# Patient Record
Sex: FEMALE | Race: WHITE | NOT HISPANIC OR LATINO | Employment: FULL TIME | URBAN - METROPOLITAN AREA
[De-identification: names, ages, dates, MRNs, and addresses within clinical notes are randomized per-mention and may not be internally consistent; named-entity substitution may affect disease eponyms.]

---

## 2017-04-03 ENCOUNTER — OFFICE VISIT (OUTPATIENT)
Dept: OBSTETRICS AND GYNECOLOGY | Facility: CLINIC | Age: 27
End: 2017-04-03
Payer: COMMERCIAL

## 2017-04-03 VITALS
HEIGHT: 66 IN | SYSTOLIC BLOOD PRESSURE: 112 MMHG | BODY MASS INDEX: 17.22 KG/M2 | DIASTOLIC BLOOD PRESSURE: 68 MMHG | WEIGHT: 107.13 LBS

## 2017-04-03 DIAGNOSIS — N89.8 VAGINAL DISCHARGE: Primary | ICD-10-CM

## 2017-04-03 PROCEDURE — 99999 PR PBB SHADOW E&M-EST. PATIENT-LVL II: CPT | Mod: PBBFAC,,, | Performed by: OBSTETRICS & GYNECOLOGY

## 2017-04-03 PROCEDURE — 87210 SMEAR WET MOUNT SALINE/INK: CPT | Mod: QW,S$GLB,, | Performed by: OBSTETRICS & GYNECOLOGY

## 2017-04-03 PROCEDURE — 99213 OFFICE O/P EST LOW 20 MIN: CPT | Mod: 25,S$GLB,, | Performed by: OBSTETRICS & GYNECOLOGY

## 2017-04-03 PROCEDURE — 1160F RVW MEDS BY RX/DR IN RCRD: CPT | Mod: S$GLB,,, | Performed by: OBSTETRICS & GYNECOLOGY

## 2017-04-03 PROCEDURE — 87591 N.GONORRHOEAE DNA AMP PROB: CPT

## 2017-04-03 NOTE — PROGRESS NOTES
Subjective:       Patient ID: Diana Staley is a 26 y.o. female.    Chief Complaint:  Vaginal Discharge      History of Present Illness  HPI  Presents with a brown vaginal discharge.  It started on 3/6/17.  States she took her OCP about 1 hour late on 3/5/17, but otherwise no changes.  No vaginal odor, itching, or burning.  The discharge continued until 2 days before her placebo week of pills.  At that point, it became more red, then she had her period on 3/19/17.  Since her period stopped, she no longer notices the discharge.  No new sex partners.    GYN & OB History  No LMP recorded (lmp unknown). Patient is not currently having periods (Reason: Birth Control).   Date of Last Pap: 3/27/2015    OB History    Para Term  AB SAB TAB Ectopic Multiple Living   0 0 0 0 0 0 0 0 0 0             Review of Systems  Review of Systems   Constitutional: Negative for fatigue, fever and unexpected weight change.   Gastrointestinal: Negative for abdominal pain, constipation, diarrhea, nausea and vomiting.   Genitourinary: Positive for vaginal discharge. Negative for dysuria, frequency, urgency, vaginal bleeding, vaginal pain, postcoital bleeding and vaginal odor.           Objective:    Physical Exam:   Constitutional: She is oriented to person, place, and time. She appears well-developed and well-nourished. No distress.             Abdominal: Soft. She exhibits no distension and no mass. There is no tenderness. There is no rebound and no guarding. Hernia confirmed negative in the right inguinal area and confirmed negative in the left inguinal area.     Genitourinary: Vagina normal. Pelvic exam was performed with patient supine. There is no rash, tenderness, lesion or injury on the right labia. There is no rash, tenderness, lesion or injury on the left labia. Uterus is not deviated, not enlarged, not fixed, not tender and not experiencing uterine prolapse. Right adnexum displays no mass, no tenderness  and no fullness. Left adnexum displays no mass, no tenderness and no fullness. No erythema, tenderness, bleeding, rectocele, cystocele or unspecified prolapse of vaginal walls in the vagina. No foreign body in the vagina. No signs of injury around the vagina. No vaginal discharge found. Cervix exhibits no motion tenderness, no discharge and no friability.        Uterus Size: 6 cm       Neurological: She is alert and oriented to person, place, and time.     Psychiatric: She has a normal mood and affect.        Wet prep: normal vaginal squamous epithelial cells    Assessment:        1. Vaginal discharge                Plan:      Diana was seen today for vaginal discharge.    Diagnoses and all orders for this visit:    Vaginal discharge  -     C. trachomatis/N. gonorrhoeae by AMP DNA Urine    Reassured the patient that her discharge was likely some light breakthrough bleeding related to a slight delay in taking her OCP the day prior.  Her symptoms have resolved.    RTC prn.

## 2017-04-04 LAB
C TRACH DNA SPEC QL NAA+PROBE: NOT DETECTED
N GONORRHOEA DNA SPEC QL NAA+PROBE: NOT DETECTED

## 2017-11-04 DIAGNOSIS — Z30.011 ENCOUNTER FOR INITIAL PRESCRIPTION OF CONTRACEPTIVE PILLS: ICD-10-CM

## 2017-11-06 DIAGNOSIS — Z30.011 ENCOUNTER FOR INITIAL PRESCRIPTION OF CONTRACEPTIVE PILLS: ICD-10-CM

## 2017-11-06 RX ORDER — NORGESTIMATE AND ETHINYL ESTRADIOL 0.25-0.035
1 KIT ORAL DAILY
Qty: 28 TABLET | Refills: 0 | OUTPATIENT
Start: 2017-11-06 | End: 2018-11-06

## 2017-11-06 NOTE — TELEPHONE ENCOUNTER
Pt's last annual 11/29/16. Pt notified that she is due for an annual at the end of this month. Medication pending. Please advise.

## 2017-11-27 DIAGNOSIS — Z30.011 ENCOUNTER FOR INITIAL PRESCRIPTION OF CONTRACEPTIVE PILLS: ICD-10-CM

## 2017-11-27 RX ORDER — NORGESTIMATE AND ETHINYL ESTRADIOL 0.25-0.035
1 KIT ORAL DAILY
Qty: 28 TABLET | Refills: 0 | Status: SHIPPED | OUTPATIENT
Start: 2017-11-27 | End: 2017-12-05 | Stop reason: SDUPTHER

## 2017-11-27 NOTE — TELEPHONE ENCOUNTER
Pt's last annual was 11/2016. Pt scheduled for annual exam 12/5/17. Pt requesting refill on OCP. Medication pended.

## 2017-12-05 ENCOUNTER — OFFICE VISIT (OUTPATIENT)
Dept: OBSTETRICS AND GYNECOLOGY | Facility: CLINIC | Age: 27
End: 2017-12-05
Payer: COMMERCIAL

## 2017-12-05 VITALS
SYSTOLIC BLOOD PRESSURE: 112 MMHG | HEIGHT: 66 IN | BODY MASS INDEX: 17.65 KG/M2 | DIASTOLIC BLOOD PRESSURE: 70 MMHG | WEIGHT: 109.81 LBS

## 2017-12-05 DIAGNOSIS — Z30.41 ENCOUNTER FOR SURVEILLANCE OF CONTRACEPTIVE PILLS: ICD-10-CM

## 2017-12-05 DIAGNOSIS — Z01.419 PAP SMEAR, AS PART OF ROUTINE GYNECOLOGICAL EXAMINATION: ICD-10-CM

## 2017-12-05 DIAGNOSIS — Z01.419 WELL WOMAN EXAM WITH ROUTINE GYNECOLOGICAL EXAM: Primary | ICD-10-CM

## 2017-12-05 PROCEDURE — 99999 PR PBB SHADOW E&M-EST. PATIENT-LVL III: CPT | Mod: PBBFAC,,, | Performed by: OBSTETRICS & GYNECOLOGY

## 2017-12-05 PROCEDURE — 99395 PREV VISIT EST AGE 18-39: CPT | Mod: S$GLB,,, | Performed by: OBSTETRICS & GYNECOLOGY

## 2017-12-05 PROCEDURE — 88175 CYTOPATH C/V AUTO FLUID REDO: CPT

## 2017-12-05 RX ORDER — NORGESTIMATE AND ETHINYL ESTRADIOL 0.25-0.035
1 KIT ORAL DAILY
Qty: 28 TABLET | Refills: 12 | Status: SHIPPED | OUTPATIENT
Start: 2017-12-05 | End: 2018-01-10 | Stop reason: ALTCHOICE

## 2017-12-05 NOTE — PROGRESS NOTES
Subjective:       Patient ID: Diana Staley is a 27 y.o. female.    Chief Complaint:  Annual Exam      History of Present Illness  HPI  Presents for well-woman exam.  No gyn complaints.  Needs refill of Mononessa OCP's.  Doing well on them.  Is mutually monogomous with the same male partner.  No new sex partners in the past year.  Declines STD screening.  Pap due today.    GYN & OB History  Patient's last menstrual period was 2017.   Date of Last Pap: 3/27/2015    OB History    Para Term  AB Living   0 0 0 0 0 0   SAB TAB Ectopic Multiple Live Births   0 0 0 0               Review of Systems  Review of Systems   Constitutional: Negative for activity change, fatigue, fever and unexpected weight change.   Gastrointestinal: Negative for abdominal pain, bloating, constipation, diarrhea, nausea and vomiting.   Endocrine: Negative for hair loss and hot flashes.   Genitourinary: Negative for dyspareunia, dysuria, frequency, genital sores, hematuria, menorrhagia, menstrual problem, pelvic pain, urgency, vaginal bleeding, vaginal discharge, vaginal pain, dysmenorrhea, postcoital bleeding and vaginal odor.   Skin:  Negative for rash and hair changes.   Hematological: Negative for adenopathy.   Breast: Negative for breast mass, breast pain, nipple discharge and skin changes          Objective:    Physical Exam:   Constitutional: She is oriented to person, place, and time. She appears well-developed and well-nourished. No distress.      Neck: Neck supple. No thyromegaly present.     Pulmonary/Chest: Right breast exhibits no inverted nipple, no mass, no nipple discharge, no skin change, no tenderness, no bleeding and no swelling. Left breast exhibits no inverted nipple, no mass, no nipple discharge, no skin change, no tenderness, no bleeding and no swelling. Breasts are symmetrical.        Abdominal: Soft. She exhibits no distension and no mass. There is no tenderness. There is no rebound and no  guarding.     Genitourinary: Pelvic exam was performed with patient supine. There is no rash, tenderness, lesion or injury on the right labia. There is no rash, tenderness, lesion or injury on the left labia. Uterus is not deviated, not enlarged, not fixed, not tender, not hosting fibroids and not experiencing uterine prolapse. Cervix is normal. Right adnexum displays no mass, no tenderness and no fullness. Left adnexum displays no mass, no tenderness and no fullness. No erythema, tenderness, bleeding, rectocele or cystocele in the vagina. No foreign body in the vagina. No signs of injury around the vagina. No vaginal discharge found. Cervix exhibits no motion tenderness, no discharge and no friability.        Uterus Size: 6 cm      Lymphadenopathy:        Right: No inguinal adenopathy present.        Left: No inguinal adenopathy present.    Neurological: She is alert and oriented to person, place, and time.     Psychiatric: She has a normal mood and affect.          Assessment:        1. Well woman exam with routine gynecological exam    2. Pap smear, as part of routine gynecological examination    3. Encounter for surveillance of contraceptive pills                Plan:      Diana was seen today for annual exam.    Diagnoses and all orders for this visit:    Well woman exam with routine gynecological exam    Pap smear, as part of routine gynecological examination  -     Liquid-based pap smear, screening    Encounter for surveillance of contraceptive pills  -     norgestimate-ethinyl estradiol (MONONESSA, 28,) 0.25-35 mg-mcg per tablet; Take 1 tablet by mouth once daily.    Reviewed updated recommendations for pap smears (every 3 years) in low risk patients.   Recommend annual pelvic exams.  Reviewed recommendations for annual CBE.  RTC 1 year.

## 2018-01-10 ENCOUNTER — PATIENT MESSAGE (OUTPATIENT)
Dept: OBSTETRICS AND GYNECOLOGY | Facility: CLINIC | Age: 28
End: 2018-01-10

## 2018-01-10 DIAGNOSIS — Z30.011 ENCOUNTER FOR INITIAL PRESCRIPTION OF CONTRACEPTIVE PILLS: Primary | ICD-10-CM

## 2018-01-10 RX ORDER — LEVONORGESTREL AND ETHINYL ESTRADIOL 0.15-0.03
1 KIT ORAL DAILY
Qty: 28 TABLET | Refills: 11 | Status: SHIPPED | OUTPATIENT
Start: 2018-01-10 | End: 2018-11-19 | Stop reason: SDUPTHER

## 2018-04-19 ENCOUNTER — PATIENT MESSAGE (OUTPATIENT)
Dept: OBSTETRICS AND GYNECOLOGY | Facility: CLINIC | Age: 28
End: 2018-04-19

## 2018-11-19 ENCOUNTER — OFFICE VISIT (OUTPATIENT)
Dept: OBSTETRICS AND GYNECOLOGY | Facility: CLINIC | Age: 28
End: 2018-11-19
Payer: COMMERCIAL

## 2018-11-19 VITALS
WEIGHT: 112.44 LBS | HEIGHT: 66 IN | BODY MASS INDEX: 18.07 KG/M2 | SYSTOLIC BLOOD PRESSURE: 112 MMHG | DIASTOLIC BLOOD PRESSURE: 80 MMHG

## 2018-11-19 DIAGNOSIS — Z01.419 WELL WOMAN EXAM WITH ROUTINE GYNECOLOGICAL EXAM: Primary | ICD-10-CM

## 2018-11-19 DIAGNOSIS — Z30.011 ENCOUNTER FOR INITIAL PRESCRIPTION OF CONTRACEPTIVE PILLS: ICD-10-CM

## 2018-11-19 DIAGNOSIS — N92.1 BREAKTHROUGH BLEEDING ON BIRTH CONTROL PILLS: ICD-10-CM

## 2018-11-19 PROCEDURE — 87591 N.GONORRHOEAE DNA AMP PROB: CPT

## 2018-11-19 PROCEDURE — 99395 PREV VISIT EST AGE 18-39: CPT | Mod: S$GLB,,, | Performed by: OBSTETRICS & GYNECOLOGY

## 2018-11-19 PROCEDURE — 99999 PR PBB SHADOW E&M-EST. PATIENT-LVL III: CPT | Mod: PBBFAC,,, | Performed by: OBSTETRICS & GYNECOLOGY

## 2018-11-19 RX ORDER — LEVONORGESTREL AND ETHINYL ESTRADIOL 0.15-0.03
1 KIT ORAL DAILY
Qty: 28 TABLET | Refills: 11 | Status: SHIPPED | OUTPATIENT
Start: 2018-11-19 | End: 2019-11-19 | Stop reason: SDUPTHER

## 2018-11-19 NOTE — PATIENT INSTRUCTIONS
Breast Health: Breast Self-Awareness  What is breast self-awareness?  Breast self-awareness is knowing how your breasts normally look and feel. Your breasts change as you go through different stages of your life. So its important to learn what is normal for your breasts. Breast self-awareness helps you notice any changes in your breasts right away. Report any changes to your healthcare provider.  Why is breast self-awareness important?  Many experts now say that women should focus on breast self-awareness instead of doing a breast self-examination (BSE). These experts include the American Cancer Society, the U.S. Preventive Services Task Force, and the American Congress of Obstetricians and Gynecologists. Some experts even advise not teaching women to do a BSE. Thats because research hasnt shown a clear benefit to doing BSEs.  Breast self-awareness is different than a BSE. Breast self-awareness isnt about following a certain method and schedule. Its about knowing what's normal for your breasts. That way you can notice even small changes right away. If you see any changes, report them to your healthcare provider.  Changes to look for  Call your healthcare provider if you find any changes in your breasts that concern you. These changes may include:  · A lump  · Nipple discharge other than breast milk, especially a bloody discharge  · Swelling  · A change in size or shape  · Skin irritation, such as redness, thickening, or dimpling of the skin  · Swollen lymph nodes in the armpit  · Nipple problems, such as pain or redness  If you find a lump  Contact your provider if you find lumpiness in one breast, feel something different in the tissue, or feel a definite lump. Sometimes lumpiness may be due to menstrual changes. But there may be reason for concern.  Your provider may want to see you right away if you have:  · Nipple discharge that is bloody  · Skin changes on your breast, such as dimpling or puckering  Its  normal to be upset if you find a lump. But its important to contact your provider right away. Remember that most breast lumps are benign. This means they are not cancer.  Date Last Reviewed: 8/10/2015  © 4162-0992 [employer]. [address] [place] [address], [place] [address]. All rights reserved. This information is not intended as a substitute for professional medical care. Always follow your healthcare professional's instructions.

## 2018-11-19 NOTE — PROGRESS NOTES
Subjective:       Patient ID: Diana Staley is a 27 y.o. female.    Chief Complaint:  Well Woman      History of Present Illness  HPI  Presents for well-woman exam and to discuss contraception.  Pt takes nordette OCP's, and really likes them.  Has been on them a little over 1 year.  Has noticed some breakthrough bleeding during her second week of pills for the last couple of months, but none this month.  Denies any vaginal discharge or postcoital bleeding.  Is mutually monogamous with a male partner, and has no new sex partners in the last year.  Prefers to stay on her pill for now, and will see how things go the next few months.  Needs refill.  Last pap: 2017: normal    GYN & OB History  Patient's last menstrual period was 10/29/2018 (exact date).   Date of Last Pap: 2017    OB History    Para Term  AB Living   0 0 0 0 0 0   SAB TAB Ectopic Multiple Live Births   0 0 0 0               Review of Systems  Review of Systems   Constitutional: Negative for fatigue, fever and unexpected weight change.   Gastrointestinal: Negative for abdominal pain, constipation, diarrhea, nausea and vomiting.   Genitourinary: Positive for menstrual problem (see HPI). Negative for dysuria, frequency, menorrhagia, urgency, vaginal bleeding, vaginal discharge, vaginal pain, dysmenorrhea, postcoital bleeding and vaginal odor.   Psychiatric/Behavioral: Negative for depression and sleep disturbance. The patient is not nervous/anxious.    Breast: Negative for breast mass, breast pain and nipple discharge          Objective:    Physical Exam:   Constitutional: She is oriented to person, place, and time. She appears well-developed and well-nourished. No distress.             Abdominal: Soft. She exhibits no distension and no mass. There is no tenderness. There is no rebound and no guarding. Hernia confirmed negative in the right inguinal area and confirmed negative in the left inguinal area.     Genitourinary:  Vagina normal. Pelvic exam was performed with patient supine. There is no rash, tenderness, lesion or injury on the right labia. There is no rash, tenderness, lesion or injury on the left labia. Uterus is not deviated, not enlarged, not fixed, not tender and not experiencing uterine prolapse. Right adnexum displays no mass, no tenderness and no fullness. Left adnexum displays no mass, no tenderness and no fullness. No erythema, tenderness, bleeding, rectocele, cystocele or unspecified prolapse of vaginal walls in the vagina. No foreign body in the vagina. No signs of injury around the vagina. No vaginal discharge found. Cervix exhibits no motion tenderness, no discharge and no friability.        Uterus Size: 6 cm       Neurological: She is alert and oriented to person, place, and time.     Psychiatric: She has a normal mood and affect.          Assessment:        1. Well woman exam with routine gynecological exam    2. Encounter for initial prescription of contraceptive pills    3. Breakthrough bleeding on birth control pills                Plan:      Diana was seen today for well woman.    Diagnoses and all orders for this visit:    Well woman exam with routine gynecological exam    Encounter for initial prescription of contraceptive pills  -     levonorgestrel-ethinyl estradiol (NORDETTE) 0.15-0.03 mg per tablet; Take 1 tablet by mouth once daily.    Breakthrough bleeding on birth control pills  -     C. trachomatis/N. gonorrhoeae by AMP DNA    Reassured pt of normal exam.  Wants to continue her pill for now, and will see how things go the next few months.  Will screen for GC/CT.  RTC 1 year or sooner prn.  Next pap due in 2020.

## 2018-11-20 LAB
C TRACH DNA SPEC QL NAA+PROBE: NOT DETECTED
N GONORRHOEA DNA SPEC QL NAA+PROBE: NOT DETECTED

## 2019-11-19 ENCOUNTER — LAB VISIT (OUTPATIENT)
Dept: LAB | Facility: HOSPITAL | Age: 29
End: 2019-11-19
Attending: OBSTETRICS & GYNECOLOGY
Payer: COMMERCIAL

## 2019-11-19 ENCOUNTER — OFFICE VISIT (OUTPATIENT)
Dept: OBSTETRICS AND GYNECOLOGY | Facility: CLINIC | Age: 29
End: 2019-11-19
Payer: COMMERCIAL

## 2019-11-19 VITALS
WEIGHT: 114 LBS | BODY MASS INDEX: 18.32 KG/M2 | HEIGHT: 66 IN | SYSTOLIC BLOOD PRESSURE: 122 MMHG | DIASTOLIC BLOOD PRESSURE: 66 MMHG

## 2019-11-19 DIAGNOSIS — Z11.3 SCREENING EXAMINATION FOR STD (SEXUALLY TRANSMITTED DISEASE): ICD-10-CM

## 2019-11-19 DIAGNOSIS — Z30.011 ENCOUNTER FOR INITIAL PRESCRIPTION OF CONTRACEPTIVE PILLS: ICD-10-CM

## 2019-11-19 DIAGNOSIS — Z01.419 WELL WOMAN EXAM WITH ROUTINE GYNECOLOGICAL EXAM: Primary | ICD-10-CM

## 2019-11-19 PROCEDURE — 87210 SMEAR WET MOUNT SALINE/INK: CPT | Mod: QW,S$GLB,, | Performed by: OBSTETRICS & GYNECOLOGY

## 2019-11-19 PROCEDURE — 99395 PREV VISIT EST AGE 18-39: CPT | Mod: 25,S$GLB,, | Performed by: OBSTETRICS & GYNECOLOGY

## 2019-11-19 PROCEDURE — 99999 PR PBB SHADOW E&M-EST. PATIENT-LVL III: ICD-10-PCS | Mod: PBBFAC,,, | Performed by: OBSTETRICS & GYNECOLOGY

## 2019-11-19 PROCEDURE — 36415 COLL VENOUS BLD VENIPUNCTURE: CPT

## 2019-11-19 PROCEDURE — 87491 CHLMYD TRACH DNA AMP PROBE: CPT

## 2019-11-19 PROCEDURE — 86703 HIV-1/HIV-2 1 RESULT ANTBDY: CPT

## 2019-11-19 PROCEDURE — 87340 HEPATITIS B SURFACE AG IA: CPT

## 2019-11-19 PROCEDURE — 86803 HEPATITIS C AB TEST: CPT

## 2019-11-19 PROCEDURE — 99999 PR PBB SHADOW E&M-EST. PATIENT-LVL III: CPT | Mod: PBBFAC,,, | Performed by: OBSTETRICS & GYNECOLOGY

## 2019-11-19 PROCEDURE — 86592 SYPHILIS TEST NON-TREP QUAL: CPT

## 2019-11-19 PROCEDURE — 99395 PR PREVENTIVE VISIT,EST,18-39: ICD-10-PCS | Mod: 25,S$GLB,, | Performed by: OBSTETRICS & GYNECOLOGY

## 2019-11-19 PROCEDURE — 87210 PR  SMEAR,STAIN,WET MNT,INTERP: ICD-10-PCS | Mod: QW,S$GLB,, | Performed by: OBSTETRICS & GYNECOLOGY

## 2019-11-19 RX ORDER — LEVONORGESTREL AND ETHINYL ESTRADIOL 0.15-0.03
1 KIT ORAL DAILY
Qty: 28 TABLET | Refills: 11 | Status: SHIPPED | OUTPATIENT
Start: 2019-11-19 | End: 2020-10-15 | Stop reason: SDUPTHER

## 2019-11-19 NOTE — PROGRESS NOTES
Subjective:       Patient ID: Diana Staley is a 28 y.o. female.    Chief Complaint:  Well Woman      History of Present Illness  HPI  Presents for well-woman exam.  No gyn complaints.  Uses Nordette OCP's, and does well on them.  Needs refill.  She is sexually active, and has a new sexual partner.  Wants STD screening.   Last pap: 2017: normal    GYN & OB History  Patient's last menstrual period was 10/29/2019 (exact date).   Date of Last Pap: 2017    OB History    Para Term  AB Living   0 0 0 0 0 0   SAB TAB Ectopic Multiple Live Births   0 0 0 0         Review of Systems  Review of Systems   Constitutional: Negative for activity change, fatigue, fever and unexpected weight change.   Gastrointestinal: Negative for abdominal pain, bloating, constipation, diarrhea, nausea and vomiting.   Endocrine: Negative for hair loss and hot flashes.   Genitourinary: Negative for dysmenorrhea, dyspareunia, dysuria, frequency, genital sores, hematuria, menorrhagia, menstrual problem, pelvic pain, urgency, vaginal bleeding, vaginal discharge, vaginal pain, postcoital bleeding and vaginal odor.   Integumentary:  Negative for rash, hair changes, breast mass, nipple discharge and breast skin changes.   Hematological: Negative for adenopathy.   Breast: Negative for mass, mastodynia, nipple discharge and skin changes          Objective:    Physical Exam:   Constitutional: She is oriented to person, place, and time. She appears well-developed and well-nourished. No distress.      Neck: Neck supple. No thyromegaly present.     Pulmonary/Chest: Right breast exhibits no inverted nipple, no mass, no nipple discharge, no skin change, no tenderness, no bleeding and no swelling. Left breast exhibits no inverted nipple, no mass, no nipple discharge, no skin change, no tenderness, no bleeding and no swelling. Breasts are symmetrical.        Abdominal: Soft. She exhibits no distension and no mass. There is no  tenderness. There is no rebound and no guarding.     Genitourinary: Pelvic exam was performed with patient supine. There is no rash, tenderness, lesion or injury on the right labia. There is no rash, tenderness, lesion or injury on the left labia. Uterus is not deviated, not enlarged, not fixed, not tender, not hosting fibroids and not experiencing uterine prolapse. Cervix is normal. Right adnexum displays no mass, no tenderness and no fullness. Left adnexum displays no mass, no tenderness and no fullness. No erythema, tenderness, bleeding, rectocele or cystocele in the vagina. No foreign body in the vagina. No signs of injury around the vagina. No vaginal discharge found. Cervix exhibits no motion tenderness, no discharge and no friability.        Uterus Size: 6 cm      Lymphadenopathy:        Right: No inguinal adenopathy present.        Left: No inguinal adenopathy present.    Neurological: She is alert and oriented to person, place, and time.     Psychiatric: She has a normal mood and affect.        Wet prep: normal vaginal squamous epithelial cells    Assessment:        1. Well woman exam with routine gynecological exam    2. Screening examination for STD (sexually transmitted disease)    3. Encounter for initial prescription of contraceptive pills                Plan:      Diana was seen today for well woman.    Diagnoses and all orders for this visit:    Well woman exam with routine gynecological exam    Screening examination for STD (sexually transmitted disease)  -     C. trachomatis/N. gonorrhoeae by AMP DNA  -     HIV 1/2 Ag/Ab (4th Gen); Future  -     RPR; Future  -     Hepatitis B surface antigen; Future  -     Hepatitis C antibody; Future    Encounter for initial prescription of contraceptive pills  -     levonorgestrel-ethinyl estradiol (NORDETTE) 0.15-0.03 mg per tablet; Take 1 tablet by mouth once daily.    Reviewed updated recommendations for pap smears (every 3 years) in low risk patients.    Recommend annual pelvic exams.  Reviewed recommendations for annual CBE.  Next pap due in 2020.  RTC 1 year.

## 2019-11-19 NOTE — PATIENT INSTRUCTIONS
Breast Health: Breast Self-Awareness  What is breast self-awareness?  Breast self-awareness is knowing how your breasts normally look and feel. Your breasts change as you go through different stages of your life. So its important to learn what is normal for your breasts. Breast self-awareness helps you notice any changes in your breasts right away. Report any changes to your healthcare provider.  Why is breast self-awareness important?  Many experts now say that women should focus on breast self-awareness instead of doing a breast self-examination (BSE). These experts include the American Cancer Society, the U.S. Preventive Services Task Force, and the American Congress of Obstetricians and Gynecologists. Some experts even advise not teaching women to do a BSE. Thats because research hasnt shown a clear benefit to doing BSEs.  Breast self-awareness is different than a BSE. Breast self-awareness isnt about following a certain method and schedule. Its about knowing what's normal for your breasts. That way you can notice even small changes right away. If you see any changes, report them to your healthcare provider.  Changes to look for  Call your healthcare provider if you find any changes in your breasts that concern you. These changes may include:  · A lump  · Nipple discharge other than breast milk, especially a bloody discharge  · Swelling  · A change in size or shape  · Skin irritation, such as redness, thickening, or dimpling of the skin  · Swollen lymph nodes in the armpit  · Nipple problems, such as pain or redness  If you find a lump  Contact your provider if you find lumpiness in one breast, feel something different in the tissue, or feel a definite lump. Sometimes lumpiness may be due to menstrual changes. But there may be reason for concern.  Your provider may want to see you right away if you have:  · Nipple discharge that is bloody  · Skin changes on your breast, such as dimpling or puckering  Its  normal to be upset if you find a lump. But its important to contact your provider right away. Remember that most breast lumps are benign. This means they are not cancer.  Date Last Reviewed: 8/10/2015  © 9103-7445 [employer]. [address] [place] [address], [place] [address]. All rights reserved. This information is not intended as a substitute for professional medical care. Always follow your healthcare professional's instructions.

## 2019-11-20 LAB
C TRACH DNA SPEC QL NAA+PROBE: NOT DETECTED
HBV SURFACE AG SERPL QL IA: NEGATIVE
HCV AB SERPL QL IA: NEGATIVE
HIV 1+2 AB+HIV1 P24 AG SERPL QL IA: NEGATIVE
N GONORRHOEA DNA SPEC QL NAA+PROBE: NOT DETECTED
RPR SER QL: NORMAL

## 2020-10-16 ENCOUNTER — PATIENT MESSAGE (OUTPATIENT)
Dept: OBSTETRICS AND GYNECOLOGY | Facility: CLINIC | Age: 30
End: 2020-10-16

## 2021-04-15 ENCOUNTER — OFFICE VISIT (OUTPATIENT)
Dept: OBSTETRICS AND GYNECOLOGY | Facility: CLINIC | Age: 31
End: 2021-04-15
Payer: COMMERCIAL

## 2021-04-15 VITALS
HEIGHT: 66 IN | DIASTOLIC BLOOD PRESSURE: 72 MMHG | WEIGHT: 109.38 LBS | SYSTOLIC BLOOD PRESSURE: 118 MMHG | BODY MASS INDEX: 17.58 KG/M2

## 2021-04-15 DIAGNOSIS — Z01.419 WELL WOMAN EXAM WITH ROUTINE GYNECOLOGICAL EXAM: Primary | ICD-10-CM

## 2021-04-15 DIAGNOSIS — Z12.4 CERVICAL CANCER SCREENING: ICD-10-CM

## 2021-04-15 DIAGNOSIS — Z30.011 ENCOUNTER FOR INITIAL PRESCRIPTION OF CONTRACEPTIVE PILLS: ICD-10-CM

## 2021-04-15 DIAGNOSIS — Z11.3 SCREEN FOR STD (SEXUALLY TRANSMITTED DISEASE): ICD-10-CM

## 2021-04-15 PROCEDURE — 99395 PR PREVENTIVE VISIT,EST,18-39: ICD-10-PCS | Mod: S$GLB,,, | Performed by: OBSTETRICS & GYNECOLOGY

## 2021-04-15 PROCEDURE — 99395 PREV VISIT EST AGE 18-39: CPT | Mod: S$GLB,,, | Performed by: OBSTETRICS & GYNECOLOGY

## 2021-04-15 PROCEDURE — 3008F BODY MASS INDEX DOCD: CPT | Mod: CPTII,S$GLB,, | Performed by: OBSTETRICS & GYNECOLOGY

## 2021-04-15 PROCEDURE — 87591 N.GONORRHOEAE DNA AMP PROB: CPT | Performed by: OBSTETRICS & GYNECOLOGY

## 2021-04-15 PROCEDURE — 99999 PR PBB SHADOW E&M-EST. PATIENT-LVL III: ICD-10-PCS | Mod: PBBFAC,,, | Performed by: OBSTETRICS & GYNECOLOGY

## 2021-04-15 PROCEDURE — 3008F PR BODY MASS INDEX (BMI) DOCUMENTED: ICD-10-PCS | Mod: CPTII,S$GLB,, | Performed by: OBSTETRICS & GYNECOLOGY

## 2021-04-15 PROCEDURE — 87491 CHLMYD TRACH DNA AMP PROBE: CPT | Performed by: OBSTETRICS & GYNECOLOGY

## 2021-04-15 PROCEDURE — 99999 PR PBB SHADOW E&M-EST. PATIENT-LVL III: CPT | Mod: PBBFAC,,, | Performed by: OBSTETRICS & GYNECOLOGY

## 2021-04-15 PROCEDURE — 88175 CYTOPATH C/V AUTO FLUID REDO: CPT | Performed by: OBSTETRICS & GYNECOLOGY

## 2021-04-15 RX ORDER — LEVONORGESTREL AND ETHINYL ESTRADIOL 0.15-0.03
1 KIT ORAL DAILY
Qty: 90 TABLET | Refills: 3 | Status: SHIPPED | OUTPATIENT
Start: 2021-04-15 | End: 2022-04-21 | Stop reason: SDUPTHER

## 2021-04-16 ENCOUNTER — PATIENT MESSAGE (OUTPATIENT)
Dept: OBSTETRICS AND GYNECOLOGY | Facility: CLINIC | Age: 31
End: 2021-04-16

## 2021-04-16 DIAGNOSIS — Z11.3 SCREEN FOR STD (SEXUALLY TRANSMITTED DISEASE): Primary | ICD-10-CM

## 2021-04-16 LAB
C TRACH DNA SPEC QL NAA+PROBE: NOT DETECTED
N GONORRHOEA DNA SPEC QL NAA+PROBE: NOT DETECTED

## 2021-04-19 ENCOUNTER — LAB VISIT (OUTPATIENT)
Dept: LAB | Facility: HOSPITAL | Age: 31
End: 2021-04-19
Attending: OBSTETRICS & GYNECOLOGY
Payer: COMMERCIAL

## 2021-04-19 DIAGNOSIS — Z11.3 SCREEN FOR STD (SEXUALLY TRANSMITTED DISEASE): ICD-10-CM

## 2021-04-19 PROCEDURE — 86592 SYPHILIS TEST NON-TREP QUAL: CPT | Performed by: OBSTETRICS & GYNECOLOGY

## 2021-04-19 PROCEDURE — 86803 HEPATITIS C AB TEST: CPT | Performed by: OBSTETRICS & GYNECOLOGY

## 2021-04-19 PROCEDURE — 86703 HIV-1/HIV-2 1 RESULT ANTBDY: CPT | Performed by: OBSTETRICS & GYNECOLOGY

## 2021-04-19 PROCEDURE — 36415 COLL VENOUS BLD VENIPUNCTURE: CPT | Performed by: OBSTETRICS & GYNECOLOGY

## 2021-04-19 PROCEDURE — 87340 HEPATITIS B SURFACE AG IA: CPT | Performed by: OBSTETRICS & GYNECOLOGY

## 2021-04-20 LAB
FINAL PATHOLOGIC DIAGNOSIS: NORMAL
HBV SURFACE AG SERPL QL IA: NEGATIVE
HCV AB SERPL QL IA: NEGATIVE
HIV 1+2 AB+HIV1 P24 AG SERPL QL IA: NEGATIVE
Lab: NORMAL
RPR SER QL: NORMAL

## 2021-10-01 ENCOUNTER — OFFICE VISIT (OUTPATIENT)
Dept: PRIMARY CARE CLINIC | Facility: CLINIC | Age: 31
End: 2021-10-01
Payer: COMMERCIAL

## 2021-10-01 VITALS
OXYGEN SATURATION: 99 % | SYSTOLIC BLOOD PRESSURE: 118 MMHG | DIASTOLIC BLOOD PRESSURE: 82 MMHG | WEIGHT: 113.19 LBS | BODY MASS INDEX: 18.27 KG/M2 | TEMPERATURE: 98 F | HEART RATE: 101 BPM

## 2021-10-01 DIAGNOSIS — F41.9 ANXIETY: ICD-10-CM

## 2021-10-01 DIAGNOSIS — R19.8 ALTERNATING CONSTIPATION AND DIARRHEA: Primary | ICD-10-CM

## 2021-10-01 DIAGNOSIS — R14.0 ABDOMINAL BLOATING: ICD-10-CM

## 2021-10-01 PROCEDURE — 3074F PR MOST RECENT SYSTOLIC BLOOD PRESSURE < 130 MM HG: ICD-10-PCS | Mod: CPTII,S$GLB,, | Performed by: PHYSICIAN ASSISTANT

## 2021-10-01 PROCEDURE — 3079F DIAST BP 80-89 MM HG: CPT | Mod: CPTII,S$GLB,, | Performed by: PHYSICIAN ASSISTANT

## 2021-10-01 PROCEDURE — 1160F RVW MEDS BY RX/DR IN RCRD: CPT | Mod: CPTII,S$GLB,, | Performed by: PHYSICIAN ASSISTANT

## 2021-10-01 PROCEDURE — 99203 OFFICE O/P NEW LOW 30 MIN: CPT | Mod: S$GLB,,, | Performed by: PHYSICIAN ASSISTANT

## 2021-10-01 PROCEDURE — 3008F PR BODY MASS INDEX (BMI) DOCUMENTED: ICD-10-PCS | Mod: CPTII,S$GLB,, | Performed by: PHYSICIAN ASSISTANT

## 2021-10-01 PROCEDURE — 1159F PR MEDICATION LIST DOCUMENTED IN MEDICAL RECORD: ICD-10-PCS | Mod: CPTII,S$GLB,, | Performed by: PHYSICIAN ASSISTANT

## 2021-10-01 PROCEDURE — 3074F SYST BP LT 130 MM HG: CPT | Mod: CPTII,S$GLB,, | Performed by: PHYSICIAN ASSISTANT

## 2021-10-01 PROCEDURE — 1159F MED LIST DOCD IN RCRD: CPT | Mod: CPTII,S$GLB,, | Performed by: PHYSICIAN ASSISTANT

## 2021-10-01 PROCEDURE — 1160F PR REVIEW ALL MEDS BY PRESCRIBER/CLIN PHARMACIST DOCUMENTED: ICD-10-PCS | Mod: CPTII,S$GLB,, | Performed by: PHYSICIAN ASSISTANT

## 2021-10-01 PROCEDURE — 99999 PR PBB SHADOW E&M-EST. PATIENT-LVL III: ICD-10-PCS | Mod: PBBFAC,,, | Performed by: PHYSICIAN ASSISTANT

## 2021-10-01 PROCEDURE — 3008F BODY MASS INDEX DOCD: CPT | Mod: CPTII,S$GLB,, | Performed by: PHYSICIAN ASSISTANT

## 2021-10-01 PROCEDURE — 99999 PR PBB SHADOW E&M-EST. PATIENT-LVL III: CPT | Mod: PBBFAC,,, | Performed by: PHYSICIAN ASSISTANT

## 2021-10-01 PROCEDURE — 3079F PR MOST RECENT DIASTOLIC BLOOD PRESSURE 80-89 MM HG: ICD-10-PCS | Mod: CPTII,S$GLB,, | Performed by: PHYSICIAN ASSISTANT

## 2021-10-01 PROCEDURE — 99203 PR OFFICE/OUTPT VISIT, NEW, LEVL III, 30-44 MIN: ICD-10-PCS | Mod: S$GLB,,, | Performed by: PHYSICIAN ASSISTANT

## 2021-10-01 RX ORDER — BUSPIRONE HYDROCHLORIDE 5 MG/1
TABLET ORAL
Qty: 60 TABLET | Refills: 11 | Status: SHIPPED | OUTPATIENT
Start: 2021-10-01

## 2021-12-20 ENCOUNTER — IMMUNIZATION (OUTPATIENT)
Dept: PRIMARY CARE CLINIC | Facility: CLINIC | Age: 31
End: 2021-12-20
Payer: COMMERCIAL

## 2021-12-20 DIAGNOSIS — Z23 NEED FOR VACCINATION: Primary | ICD-10-CM

## 2021-12-20 PROCEDURE — 0004A COVID-19, MRNA, LNP-S, PF, 30 MCG/0.3 ML DOSE VACCINE: CPT | Mod: CV19,PBBFAC | Performed by: FAMILY MEDICINE

## 2022-04-21 ENCOUNTER — OFFICE VISIT (OUTPATIENT)
Dept: OBSTETRICS AND GYNECOLOGY | Facility: CLINIC | Age: 32
End: 2022-04-21
Payer: COMMERCIAL

## 2022-04-21 VITALS
WEIGHT: 110.25 LBS | HEIGHT: 66 IN | BODY MASS INDEX: 17.72 KG/M2 | DIASTOLIC BLOOD PRESSURE: 80 MMHG | SYSTOLIC BLOOD PRESSURE: 132 MMHG

## 2022-04-21 DIAGNOSIS — Z30.011 ENCOUNTER FOR INITIAL PRESCRIPTION OF CONTRACEPTIVE PILLS: ICD-10-CM

## 2022-04-21 DIAGNOSIS — Z01.419 WELL WOMAN EXAM WITH ROUTINE GYNECOLOGICAL EXAM: Primary | ICD-10-CM

## 2022-04-21 DIAGNOSIS — Z11.3 SCREEN FOR STD (SEXUALLY TRANSMITTED DISEASE): ICD-10-CM

## 2022-04-21 PROCEDURE — 99395 PR PREVENTIVE VISIT,EST,18-39: ICD-10-PCS | Mod: S$GLB,,, | Performed by: OBSTETRICS & GYNECOLOGY

## 2022-04-21 PROCEDURE — 1160F PR REVIEW ALL MEDS BY PRESCRIBER/CLIN PHARMACIST DOCUMENTED: ICD-10-PCS | Mod: CPTII,S$GLB,, | Performed by: OBSTETRICS & GYNECOLOGY

## 2022-04-21 PROCEDURE — 87591 N.GONORRHOEAE DNA AMP PROB: CPT | Performed by: OBSTETRICS & GYNECOLOGY

## 2022-04-21 PROCEDURE — 99999 PR PBB SHADOW E&M-EST. PATIENT-LVL III: ICD-10-PCS | Mod: PBBFAC,,, | Performed by: OBSTETRICS & GYNECOLOGY

## 2022-04-21 PROCEDURE — 1160F RVW MEDS BY RX/DR IN RCRD: CPT | Mod: CPTII,S$GLB,, | Performed by: OBSTETRICS & GYNECOLOGY

## 2022-04-21 PROCEDURE — 87491 CHLMYD TRACH DNA AMP PROBE: CPT | Performed by: OBSTETRICS & GYNECOLOGY

## 2022-04-21 PROCEDURE — 99999 PR PBB SHADOW E&M-EST. PATIENT-LVL III: CPT | Mod: PBBFAC,,, | Performed by: OBSTETRICS & GYNECOLOGY

## 2022-04-21 PROCEDURE — 3079F DIAST BP 80-89 MM HG: CPT | Mod: CPTII,S$GLB,, | Performed by: OBSTETRICS & GYNECOLOGY

## 2022-04-21 PROCEDURE — 99395 PREV VISIT EST AGE 18-39: CPT | Mod: S$GLB,,, | Performed by: OBSTETRICS & GYNECOLOGY

## 2022-04-21 PROCEDURE — 3079F PR MOST RECENT DIASTOLIC BLOOD PRESSURE 80-89 MM HG: ICD-10-PCS | Mod: CPTII,S$GLB,, | Performed by: OBSTETRICS & GYNECOLOGY

## 2022-04-21 PROCEDURE — 1159F PR MEDICATION LIST DOCUMENTED IN MEDICAL RECORD: ICD-10-PCS | Mod: CPTII,S$GLB,, | Performed by: OBSTETRICS & GYNECOLOGY

## 2022-04-21 PROCEDURE — 3008F PR BODY MASS INDEX (BMI) DOCUMENTED: ICD-10-PCS | Mod: CPTII,S$GLB,, | Performed by: OBSTETRICS & GYNECOLOGY

## 2022-04-21 PROCEDURE — 3075F SYST BP GE 130 - 139MM HG: CPT | Mod: CPTII,S$GLB,, | Performed by: OBSTETRICS & GYNECOLOGY

## 2022-04-21 PROCEDURE — 1159F MED LIST DOCD IN RCRD: CPT | Mod: CPTII,S$GLB,, | Performed by: OBSTETRICS & GYNECOLOGY

## 2022-04-21 PROCEDURE — 3075F PR MOST RECENT SYSTOLIC BLOOD PRESS GE 130-139MM HG: ICD-10-PCS | Mod: CPTII,S$GLB,, | Performed by: OBSTETRICS & GYNECOLOGY

## 2022-04-21 PROCEDURE — 3008F BODY MASS INDEX DOCD: CPT | Mod: CPTII,S$GLB,, | Performed by: OBSTETRICS & GYNECOLOGY

## 2022-04-21 RX ORDER — LEVONORGESTREL AND ETHINYL ESTRADIOL 0.15-0.03
1 KIT ORAL DAILY
Qty: 90 TABLET | Refills: 3 | Status: SHIPPED | OUTPATIENT
Start: 2022-04-21 | End: 2022-05-31

## 2022-04-21 NOTE — PROGRESS NOTES
Subjective:       Patient ID: Diana Staley is a 31 y.o. female.    Chief Complaint:  Well Woman      History of Present Illness  HPI  Presents for well-woman exam.  No gyn complaints.  Pt is MM with a male partner.  Requests STD screening (wants to scheduled labs to be done on a different day.  Will call to schedule).  On OCP's for contraception, and needs refills.  Pt asked about fertility issues since she's about to turn 32.  Does not foresee conceiving in the next several years, and asked if this will be a problem.   Last pap: 4/15/21: normal    GYN & OB History  Patient's last menstrual period was 2022.   Date of Last Pap: 2021    OB History    Para Term  AB Living   0 0 0 0 0 0   SAB IAB Ectopic Multiple Live Births   0 0 0 0         Review of Systems  Review of Systems   Constitutional: Negative for activity change, fatigue, fever and unexpected weight change.   Gastrointestinal: Negative for abdominal pain, bloating, constipation, diarrhea, nausea and vomiting.   Endocrine: Negative for hair loss and hot flashes.   Genitourinary: Negative for dysmenorrhea, dyspareunia, dysuria, frequency, genital sores, hematuria, menorrhagia, menstrual problem, pelvic pain, urgency, vaginal bleeding, vaginal discharge, vaginal pain, postcoital bleeding and vaginal odor.   Integumentary:  Negative for rash, hair changes, breast mass, nipple discharge and breast skin changes.   Hematological: Negative for adenopathy.   Breast: Negative for mass, mastodynia, nipple discharge and skin changes          Objective:    Physical Exam:   Constitutional: She is oriented to person, place, and time. She appears well-developed and well-nourished. No distress.      Neck: No thyromegaly present.     Pulmonary/Chest: Right breast exhibits no inverted nipple, no mass, no nipple discharge, no skin change, no tenderness, no bleeding and no swelling. Left breast exhibits no inverted nipple, no mass, no  nipple discharge, no skin change, no tenderness, no bleeding and no swelling. Breasts are symmetrical.        Abdominal: Soft. She exhibits no distension and no mass. There is no abdominal tenderness. There is no rebound and no guarding.     Genitourinary:    Pelvic exam was performed with patient supine.   There is no rash, tenderness, lesion or injury on the right labia. There is no rash, tenderness, lesion or injury on the left labia. Cervix is normal. Right adnexum displays no mass, no tenderness and no fullness. Left adnexum displays no mass, no tenderness and no fullness. No erythema,  no vaginal discharge, tenderness, bleeding, rectocele or cystocele in the vagina.    No foreign body in the vagina.      No signs of injury in the vagina.   Cervix exhibits no motion tenderness, no discharge and no friability. Uterus is not deviated, not enlarged, not fixed, not tender and not hosting fibroids.               Neurological: She is alert and oriented to person, place, and time.     Psychiatric: She has a normal mood and affect.          Assessment:        1. Well woman exam with routine gynecological exam    2. Encounter for initial prescription of contraceptive pills    3. Screen for STD (sexually transmitted disease)                Plan:      Diana was seen today for well woman.    Diagnoses and all orders for this visit:    Well woman exam with routine gynecological exam    Encounter for initial prescription of contraceptive pills  -     levonorgestrel-ethinyl estradiol (NORDETTE) 0.15-0.03 mg per tablet; Take 1 tablet by mouth once daily.    Screen for STD (sexually transmitted disease)  -     C. trachomatis/N. gonorrhoeae by AMP DNA  -     HIV 1/2 Ag/Ab (4th Gen); Future  -     RPR; Future  -     Hepatitis B Surface Antigen; Future  -     Hepatitis C Antibody; Future    Reviewed updated recommendations for pap smears (every 3 years) in low risk patients.   Recommend annual pelvic exams.  Reviewed  recommendations for annual CBE.  Next pap due in 2024.  Reassured pt that she is still young and healthy, so I do not foresee any issue with conception or future pregnancies.  Explained that fertility is a couple's disorder, so we never truly know about fertility issues until she and her partner try to conceive for at least one year.  That being said, if pt is considering egg cryopreservation, I am happy to refer to [facility].  Pt will let me know if she wants to pursue that.  Okay to RTC in 1 year.

## 2022-04-22 LAB
C TRACH DNA SPEC QL NAA+PROBE: NOT DETECTED
N GONORRHOEA DNA SPEC QL NAA+PROBE: NOT DETECTED

## 2022-12-21 ENCOUNTER — OFFICE VISIT (OUTPATIENT)
Dept: INTERNAL MEDICINE | Facility: CLINIC | Age: 32
End: 2022-12-21
Payer: COMMERCIAL

## 2022-12-21 VITALS
WEIGHT: 117.31 LBS | BODY MASS INDEX: 18.85 KG/M2 | DIASTOLIC BLOOD PRESSURE: 84 MMHG | TEMPERATURE: 99 F | OXYGEN SATURATION: 99 % | HEIGHT: 66 IN | SYSTOLIC BLOOD PRESSURE: 114 MMHG | HEART RATE: 116 BPM

## 2022-12-21 DIAGNOSIS — Z00.00 ANNUAL PHYSICAL EXAM: Primary | ICD-10-CM

## 2022-12-21 DIAGNOSIS — J02.9 SORE THROAT: ICD-10-CM

## 2022-12-21 DIAGNOSIS — K21.9 GASTROESOPHAGEAL REFLUX DISEASE, UNSPECIFIED WHETHER ESOPHAGITIS PRESENT: ICD-10-CM

## 2022-12-21 LAB
CTP QC/QA: YES
FLUAV AG NPH QL: NEGATIVE
FLUBV AG NPH QL: NEGATIVE

## 2022-12-21 PROCEDURE — 3074F SYST BP LT 130 MM HG: CPT | Mod: CPTII,S$GLB,, | Performed by: FAMILY MEDICINE

## 2022-12-21 PROCEDURE — 3008F PR BODY MASS INDEX (BMI) DOCUMENTED: ICD-10-PCS | Mod: CPTII,S$GLB,, | Performed by: FAMILY MEDICINE

## 2022-12-21 PROCEDURE — 99999 PR PBB SHADOW E&M-EST. PATIENT-LVL III: CPT | Mod: PBBFAC,,, | Performed by: FAMILY MEDICINE

## 2022-12-21 PROCEDURE — 1159F MED LIST DOCD IN RCRD: CPT | Mod: CPTII,S$GLB,, | Performed by: FAMILY MEDICINE

## 2022-12-21 PROCEDURE — 99213 OFFICE O/P EST LOW 20 MIN: CPT | Mod: S$GLB,,, | Performed by: FAMILY MEDICINE

## 2022-12-21 PROCEDURE — 3079F PR MOST RECENT DIASTOLIC BLOOD PRESSURE 80-89 MM HG: ICD-10-PCS | Mod: CPTII,S$GLB,, | Performed by: FAMILY MEDICINE

## 2022-12-21 PROCEDURE — 87804 POCT INFLUENZA A/B: ICD-10-PCS | Mod: 59,QW,S$GLB, | Performed by: FAMILY MEDICINE

## 2022-12-21 PROCEDURE — 1159F PR MEDICATION LIST DOCUMENTED IN MEDICAL RECORD: ICD-10-PCS | Mod: CPTII,S$GLB,, | Performed by: FAMILY MEDICINE

## 2022-12-21 PROCEDURE — 3074F PR MOST RECENT SYSTOLIC BLOOD PRESSURE < 130 MM HG: ICD-10-PCS | Mod: CPTII,S$GLB,, | Performed by: FAMILY MEDICINE

## 2022-12-21 PROCEDURE — 99213 PR OFFICE/OUTPT VISIT, EST, LEVL III, 20-29 MIN: ICD-10-PCS | Mod: S$GLB,,, | Performed by: FAMILY MEDICINE

## 2022-12-21 PROCEDURE — 99999 PR PBB SHADOW E&M-EST. PATIENT-LVL III: ICD-10-PCS | Mod: PBBFAC,,, | Performed by: FAMILY MEDICINE

## 2022-12-21 PROCEDURE — 87804 INFLUENZA ASSAY W/OPTIC: CPT | Mod: QW,S$GLB,, | Performed by: FAMILY MEDICINE

## 2022-12-21 PROCEDURE — 3008F BODY MASS INDEX DOCD: CPT | Mod: CPTII,S$GLB,, | Performed by: FAMILY MEDICINE

## 2022-12-21 PROCEDURE — 3079F DIAST BP 80-89 MM HG: CPT | Mod: CPTII,S$GLB,, | Performed by: FAMILY MEDICINE

## 2022-12-21 RX ORDER — HYDROGEN PEROXIDE 3 %
20 SOLUTION, NON-ORAL MISCELLANEOUS
Qty: 30 CAPSULE | Refills: 11 | Status: SHIPPED | OUTPATIENT
Start: 2022-12-21 | End: 2023-12-21

## 2022-12-21 NOTE — PROGRESS NOTES
Diana Staley  12/21/2022  [id number]    Megha Rubio MD  Patient Care Team:  Megha Rubio MD as PCP - General (Family Medicine)          Visit Type: New patient GI Issues    Chief Complaint:  Chief Complaint   Patient presents with    Establish Care     Also started with sore throat on monday       History of Present Illness:    32 year old  Talk about GI issues  GYN is Dr. Rasheed  She did STI screen    She started sore throat yesterday  No fever  She also has a HA  Pulse 116    Her GI issues flare  She has bloating. Stomach distension  Her lower back hurts, LLQ pain. She does have associated belching and indigestion, reflux.    She gets some bleeding with hard stool.  She doesn't always associate with having a BM  She assumed a food intolerance.  Stool is regular.  She said can have hard and soft stool.    She said she may have dairy     History:  Past Medical History:   Diagnosis Date    Nephrolithiasis     Tarlov cysts      History reviewed. No pertinent surgical history.  Family History   Problem Relation Age of Onset    Breast cancer Mother 48    Stroke Paternal Grandmother     Colon cancer Neg Hx     Ovarian cancer Neg Hx      Social History     Socioeconomic History    Marital status: Single   Tobacco Use    Smoking status: Never    Smokeless tobacco: Never   Substance and Sexual Activity    Alcohol use: Yes     Comment: occasional    Drug use: No    Sexual activity: Yes     Partners: Male     Birth control/protection: OCP     Patient Active Problem List   Diagnosis   (none) - all problems resolved or deleted     Review of patient's allergies indicates:  No Known Allergies    The following were reviewed at this visit: active problem list, medication list, allergies, family history, social history, and health maintenance.    Medications:  Current Outpatient Medications on File Prior to Visit   Medication Sig Dispense Refill    busPIRone (BUSPAR) 5 MG Tab Take 1 to 2 tablets PO BID prn for  anxiety 60 tablet 11    KURVELO, 28, 0.15-0.03 mg per tablet TAKE 1 TABLET BY MOUTH EVERY DAY 84 tablet 3     No current facility-administered medications on file prior to visit.       Medications have been reviewed and reconciled with patient at this visit.  Barriers to medications reviewed with patient.    Adverse reactions to current medications reviewed with patient..    Over the counter medications reviewed and reconciled with patient.    Exam:  Wt Readings from Last 3 Encounters:   12/21/22 53.2 kg (117 lb 4.6 oz)   04/21/22 50 kg (110 lb 3.7 oz)   10/01/21 51.4 kg (113 lb 3.3 oz)     Temp Readings from Last 3 Encounters:   12/21/22 99 °F (37.2 °C) (Tympanic)   10/01/21 97.5 °F (36.4 °C)     BP Readings from Last 3 Encounters:   12/21/22 114/84   04/21/22 132/80   10/01/21 118/82     Pulse Readings from Last 3 Encounters:   12/21/22 (!) 116   10/01/21 101   04/07/15 100     Body mass index is 18.93 kg/m².      Review of Systems   Constitutional: Negative.  Negative for chills and fever.   HENT:  Positive for sore throat. Negative for congestion and sinus pain.    Eyes:  Negative for blurred vision and double vision.   Respiratory:  Negative for cough, sputum production, shortness of breath and wheezing.    Cardiovascular:  Negative for chest pain, palpitations and leg swelling.   Gastrointestinal:  Positive for heartburn. Negative for abdominal pain, constipation, diarrhea, nausea and vomiting.   Genitourinary: Negative.    Musculoskeletal: Negative.    Skin: Negative.  Negative for rash.   Neurological:  Positive for headaches.   Endo/Heme/Allergies: Negative.  Negative for polydipsia. Does not bruise/bleed easily.   Psychiatric/Behavioral:  Negative for depression and substance abuse.    Physical Exam  Nursing note reviewed.   Pulmonary:      Effort: Pulmonary effort is normal. No respiratory distress.   Neurological:      Mental Status: She is alert and oriented to person, place, and time.   Psychiatric:          Mood and Affect: Mood normal.         Behavior: Behavior normal.         Thought Content: Thought content normal.         Judgment: Judgment normal.       Laboratory Reviewed ({N/A)  No results found for: WBC, HGB, HCT, PLT, CHOL, TRIG, HDL, LDLDIRECT, ALT, AST, NA, K, CL, CREATININE, BUN, CO2, TSH, PSA, INR, GLUF, HGBA1C, MICROALBUR    Diana was seen today for establish care.    Diagnoses and all orders for this visit:    Annual physical exam  -     Lipid Panel; Future  -     Comprehensive Metabolic Panel; Future  -     CBC Auto Differential; Future  -     Celiac Disease Panel; Future    Gastroesophageal reflux disease, unspecified whether esophagitis present  -     esomeprazole (NEXIUM) 20 MG capsule; Take 1 capsule (20 mg total) by mouth before breakfast.  -     Celiac Disease Panel; Future    Sore throat  -     POCT Influenza A/B      PPI  Labs  See in any anemia  Check 6-8 weeks for follow up on PPI  And then try elimination diet.    Flu negtive          Care Plan/Goals: Reviewed    Goals    None         Follow up: Follow up if symptoms worsen or fail to improve.    After visit summary was printed and given to patient upon discharge today.  Patient goals and care plan are included in After Visit Summary.

## 2022-12-23 ENCOUNTER — LAB VISIT (OUTPATIENT)
Dept: LAB | Facility: HOSPITAL | Age: 32
End: 2022-12-23
Attending: FAMILY MEDICINE
Payer: COMMERCIAL

## 2022-12-23 DIAGNOSIS — Z00.00 ANNUAL PHYSICAL EXAM: ICD-10-CM

## 2022-12-23 DIAGNOSIS — K21.9 GASTROESOPHAGEAL REFLUX DISEASE, UNSPECIFIED WHETHER ESOPHAGITIS PRESENT: ICD-10-CM

## 2022-12-23 LAB
ALBUMIN SERPL BCP-MCNC: 4 G/DL (ref 3.5–5.2)
ALP SERPL-CCNC: 50 U/L (ref 55–135)
ALT SERPL W/O P-5'-P-CCNC: 14 U/L (ref 10–44)
ANION GAP SERPL CALC-SCNC: 10 MMOL/L (ref 8–16)
AST SERPL-CCNC: 22 U/L (ref 10–40)
BASOPHILS # BLD AUTO: 0.04 K/UL (ref 0–0.2)
BASOPHILS NFR BLD: 0.6 % (ref 0–1.9)
BILIRUB SERPL-MCNC: 0.7 MG/DL (ref 0.1–1)
BUN SERPL-MCNC: 12 MG/DL (ref 6–20)
CALCIUM SERPL-MCNC: 9.2 MG/DL (ref 8.7–10.5)
CHLORIDE SERPL-SCNC: 107 MMOL/L (ref 95–110)
CHOLEST SERPL-MCNC: 131 MG/DL (ref 120–199)
CHOLEST/HDLC SERPL: 2 {RATIO} (ref 2–5)
CO2 SERPL-SCNC: 23 MMOL/L (ref 23–29)
CREAT SERPL-MCNC: 0.9 MG/DL (ref 0.5–1.4)
DIFFERENTIAL METHOD: NORMAL
EOSINOPHIL # BLD AUTO: 0.3 K/UL (ref 0–0.5)
EOSINOPHIL NFR BLD: 4.8 % (ref 0–8)
ERYTHROCYTE [DISTWIDTH] IN BLOOD BY AUTOMATED COUNT: 11.6 % (ref 11.5–14.5)
EST. GFR  (NO RACE VARIABLE): >60 ML/MIN/1.73 M^2
GLUCOSE SERPL-MCNC: 80 MG/DL (ref 70–110)
HCT VFR BLD AUTO: 39.8 % (ref 37–48.5)
HDLC SERPL-MCNC: 66 MG/DL (ref 40–75)
HDLC SERPL: 50.4 % (ref 20–50)
HGB BLD-MCNC: 12.9 G/DL (ref 12–16)
IMM GRANULOCYTES # BLD AUTO: 0.01 K/UL (ref 0–0.04)
IMM GRANULOCYTES NFR BLD AUTO: 0.2 % (ref 0–0.5)
LDLC SERPL CALC-MCNC: 51.2 MG/DL (ref 63–159)
LYMPHOCYTES # BLD AUTO: 1.9 K/UL (ref 1–4.8)
LYMPHOCYTES NFR BLD: 30.9 % (ref 18–48)
MCH RBC QN AUTO: 30.6 PG (ref 27–31)
MCHC RBC AUTO-ENTMCNC: 32.4 G/DL (ref 32–36)
MCV RBC AUTO: 95 FL (ref 82–98)
MONOCYTES # BLD AUTO: 0.3 K/UL (ref 0.3–1)
MONOCYTES NFR BLD: 5.3 % (ref 4–15)
NEUTROPHILS # BLD AUTO: 3.6 K/UL (ref 1.8–7.7)
NEUTROPHILS NFR BLD: 58.2 % (ref 38–73)
NONHDLC SERPL-MCNC: 65 MG/DL
NRBC BLD-RTO: 0 /100 WBC
PLATELET # BLD AUTO: 163 K/UL (ref 150–450)
PMV BLD AUTO: 11.2 FL (ref 9.2–12.9)
POTASSIUM SERPL-SCNC: 4.1 MMOL/L (ref 3.5–5.1)
PROT SERPL-MCNC: 7.2 G/DL (ref 6–8.4)
RBC # BLD AUTO: 4.21 M/UL (ref 4–5.4)
SODIUM SERPL-SCNC: 140 MMOL/L (ref 136–145)
TRIGL SERPL-MCNC: 69 MG/DL (ref 30–150)
WBC # BLD AUTO: 6.25 K/UL (ref 3.9–12.7)

## 2022-12-23 PROCEDURE — 36415 COLL VENOUS BLD VENIPUNCTURE: CPT | Performed by: FAMILY MEDICINE

## 2022-12-23 PROCEDURE — 86364 TISS TRNSGLTMNASE EA IG CLAS: CPT | Performed by: FAMILY MEDICINE

## 2022-12-23 PROCEDURE — 82784 ASSAY IGA/IGD/IGG/IGM EACH: CPT | Performed by: FAMILY MEDICINE

## 2022-12-23 PROCEDURE — 85025 COMPLETE CBC W/AUTO DIFF WBC: CPT | Performed by: FAMILY MEDICINE

## 2022-12-23 PROCEDURE — 80053 COMPREHEN METABOLIC PANEL: CPT | Performed by: FAMILY MEDICINE

## 2022-12-23 PROCEDURE — 80061 LIPID PANEL: CPT | Performed by: FAMILY MEDICINE

## 2022-12-29 LAB
GLIADIN PEPTIDE IGA SER-ACNC: 9 UNITS
GLIADIN PEPTIDE IGG SER-ACNC: 4 UNITS
IGA SERPL-MCNC: 232 MG/DL (ref 70–400)
TTG IGA SER-ACNC: 7 UNITS
TTG IGG SER-ACNC: 4 UNITS

## 2023-04-25 ENCOUNTER — OFFICE VISIT (OUTPATIENT)
Dept: OBSTETRICS AND GYNECOLOGY | Facility: CLINIC | Age: 33
End: 2023-04-25
Payer: COMMERCIAL

## 2023-04-25 ENCOUNTER — LAB VISIT (OUTPATIENT)
Dept: LAB | Facility: HOSPITAL | Age: 33
End: 2023-04-25
Attending: OBSTETRICS & GYNECOLOGY
Payer: COMMERCIAL

## 2023-04-25 VITALS
BODY MASS INDEX: 19.56 KG/M2 | WEIGHT: 121.69 LBS | HEIGHT: 66 IN | DIASTOLIC BLOOD PRESSURE: 64 MMHG | SYSTOLIC BLOOD PRESSURE: 120 MMHG

## 2023-04-25 DIAGNOSIS — Z11.3 SCREEN FOR STD (SEXUALLY TRANSMITTED DISEASE): ICD-10-CM

## 2023-04-25 DIAGNOSIS — Z01.419 WELL WOMAN EXAM WITH ROUTINE GYNECOLOGICAL EXAM: Primary | ICD-10-CM

## 2023-04-25 LAB
HBV SURFACE AG SERPL QL IA: NORMAL
HCV AB SERPL QL IA: NORMAL
HIV 1+2 AB+HIV1 P24 AG SERPL QL IA: NORMAL

## 2023-04-25 PROCEDURE — 1159F PR MEDICATION LIST DOCUMENTED IN MEDICAL RECORD: ICD-10-PCS | Mod: CPTII,S$GLB,, | Performed by: OBSTETRICS & GYNECOLOGY

## 2023-04-25 PROCEDURE — 3078F PR MOST RECENT DIASTOLIC BLOOD PRESSURE < 80 MM HG: ICD-10-PCS | Mod: CPTII,S$GLB,, | Performed by: OBSTETRICS & GYNECOLOGY

## 2023-04-25 PROCEDURE — 99999 PR PBB SHADOW E&M-EST. PATIENT-LVL III: ICD-10-PCS | Mod: PBBFAC,,, | Performed by: OBSTETRICS & GYNECOLOGY

## 2023-04-25 PROCEDURE — 3008F BODY MASS INDEX DOCD: CPT | Mod: CPTII,S$GLB,, | Performed by: OBSTETRICS & GYNECOLOGY

## 2023-04-25 PROCEDURE — 3008F PR BODY MASS INDEX (BMI) DOCUMENTED: ICD-10-PCS | Mod: CPTII,S$GLB,, | Performed by: OBSTETRICS & GYNECOLOGY

## 2023-04-25 PROCEDURE — 3074F SYST BP LT 130 MM HG: CPT | Mod: CPTII,S$GLB,, | Performed by: OBSTETRICS & GYNECOLOGY

## 2023-04-25 PROCEDURE — 99999 PR PBB SHADOW E&M-EST. PATIENT-LVL III: CPT | Mod: PBBFAC,,, | Performed by: OBSTETRICS & GYNECOLOGY

## 2023-04-25 PROCEDURE — 87389 HIV-1 AG W/HIV-1&-2 AB AG IA: CPT | Performed by: OBSTETRICS & GYNECOLOGY

## 2023-04-25 PROCEDURE — 99395 PR PREVENTIVE VISIT,EST,18-39: ICD-10-PCS | Mod: S$GLB,,, | Performed by: OBSTETRICS & GYNECOLOGY

## 2023-04-25 PROCEDURE — 1159F MED LIST DOCD IN RCRD: CPT | Mod: CPTII,S$GLB,, | Performed by: OBSTETRICS & GYNECOLOGY

## 2023-04-25 PROCEDURE — 87340 HEPATITIS B SURFACE AG IA: CPT | Performed by: OBSTETRICS & GYNECOLOGY

## 2023-04-25 PROCEDURE — 3074F PR MOST RECENT SYSTOLIC BLOOD PRESSURE < 130 MM HG: ICD-10-PCS | Mod: CPTII,S$GLB,, | Performed by: OBSTETRICS & GYNECOLOGY

## 2023-04-25 PROCEDURE — 86592 SYPHILIS TEST NON-TREP QUAL: CPT | Performed by: OBSTETRICS & GYNECOLOGY

## 2023-04-25 PROCEDURE — 3078F DIAST BP <80 MM HG: CPT | Mod: CPTII,S$GLB,, | Performed by: OBSTETRICS & GYNECOLOGY

## 2023-04-25 PROCEDURE — 86803 HEPATITIS C AB TEST: CPT | Performed by: OBSTETRICS & GYNECOLOGY

## 2023-04-25 PROCEDURE — 99395 PREV VISIT EST AGE 18-39: CPT | Mod: S$GLB,,, | Performed by: OBSTETRICS & GYNECOLOGY

## 2023-04-25 PROCEDURE — 36415 COLL VENOUS BLD VENIPUNCTURE: CPT | Performed by: OBSTETRICS & GYNECOLOGY

## 2023-04-25 PROCEDURE — 87591 N.GONORRHOEAE DNA AMP PROB: CPT | Performed by: OBSTETRICS & GYNECOLOGY

## 2023-04-25 NOTE — PROGRESS NOTES
Subjective:      Patient ID: Diana Staley is a 32 y.o. female.    Chief Complaint:  Well Woman      History of Present Illness  HPI  Presents for well-woman exam.  No gyn complaints.  MM with a male partner.  Would like STD screening.  On Kurvelo OCP's, and does well with them.  Not due for refills yet.  Last pap: 4/15/21: neg    GYN & OB History  Patient's last menstrual period was 2023.   Date of Last Pap: 2021    OB History    Para Term  AB Living   0 0 0 0 0 0   SAB IAB Ectopic Multiple Live Births   0 0 0 0         Review of Systems  Review of Systems       Objective:     Physical Exam:   Constitutional: She is oriented to person, place, and time. She appears well-developed and well-nourished. No distress.      Neck: No thyromegaly present.     Pulmonary/Chest: Right breast exhibits no inverted nipple, no mass, no nipple discharge, no skin change, no tenderness, no bleeding and no swelling. Left breast exhibits no inverted nipple, no mass, no nipple discharge, no skin change, no tenderness, no bleeding and no swelling. Breasts are symmetrical.        Abdominal: Soft. She exhibits no distension and no mass. There is no abdominal tenderness. There is no rebound and no guarding.     Genitourinary:    Pelvic exam was performed with patient supine.   There is no rash, tenderness, lesion or injury on the right labia. There is no rash, tenderness, lesion or injury on the left labia. Cervix is normal. Right adnexum displays no mass, no tenderness and no fullness. Left adnexum displays no mass, no tenderness and no fullness. No erythema,  no vaginal discharge, tenderness, bleeding, rectocele or cystocele in the vagina.    No foreign body in the vagina.      No signs of injury in the vagina.   Cervix exhibits no motion tenderness, no discharge and no friability. Uterus is not deviated, not enlarged, not fixed, not tender and not hosting fibroids.               Neurological: She is  alert and oriented to person, place, and time.     Psychiatric: She has a normal mood and affect.       Assessment:     1. Well woman exam with routine gynecological exam    2. Screen for STD (sexually transmitted disease)               Plan:     Diana was seen today for well woman.    Diagnoses and all orders for this visit:    Well woman exam with routine gynecological exam    Screen for STD (sexually transmitted disease)  -     C. trachomatis/N. gonorrhoeae by AMP DNA  -     HIV 1/2 Ag/Ab (4th Gen); Future  -     RPR; Future  -     Hepatitis B Surface Antigen; Future  -     Hepatitis C Antibody; Future     Reviewed updated recommendations for pap smears (every 3 years) in low risk patients.   Recommend annual pelvic exams.  Reviewed recommendations for annual CBE.  RTC 1 year.  Pt to notify me via [facility] when due for OCP refills.

## 2023-04-26 LAB — RPR SER QL: NORMAL

## 2023-04-27 LAB
C TRACH DNA SPEC QL NAA+PROBE: NOT DETECTED
N GONORRHOEA DNA SPEC QL NAA+PROBE: NOT DETECTED

## 2023-05-04 DIAGNOSIS — Z30.011 ENCOUNTER FOR INITIAL PRESCRIPTION OF CONTRACEPTIVE PILLS: ICD-10-CM

## 2023-05-09 DIAGNOSIS — Z30.011 ENCOUNTER FOR INITIAL PRESCRIPTION OF CONTRACEPTIVE PILLS: ICD-10-CM

## 2023-05-10 RX ORDER — LEVONORGESTREL AND ETHINYL ESTRADIOL 0.15-0.03
1 KIT ORAL DAILY
Qty: 84 TABLET | Refills: 3 | OUTPATIENT
Start: 2023-05-10

## 2023-05-10 RX ORDER — LEVONORGESTREL AND ETHINYL ESTRADIOL 0.15-0.03
1 KIT ORAL DAILY
Qty: 84 TABLET | Refills: 3 | Status: SHIPPED | OUTPATIENT
Start: 2023-05-10 | End: 2023-09-25

## 2023-09-25 DIAGNOSIS — Z30.011 ENCOUNTER FOR INITIAL PRESCRIPTION OF CONTRACEPTIVE PILLS: ICD-10-CM

## 2023-09-25 RX ORDER — LEVONORGESTREL AND ETHINYL ESTRADIOL 0.15-0.03
1 KIT ORAL
Qty: 84 TABLET | Refills: 3 | Status: SHIPPED | OUTPATIENT
Start: 2023-09-25 | End: 2023-12-27

## 2023-12-27 ENCOUNTER — OFFICE VISIT (OUTPATIENT)
Dept: OBSTETRICS AND GYNECOLOGY | Facility: CLINIC | Age: 33
End: 2023-12-27
Payer: COMMERCIAL

## 2023-12-27 VITALS
BODY MASS INDEX: 18.99 KG/M2 | WEIGHT: 118.19 LBS | SYSTOLIC BLOOD PRESSURE: 118 MMHG | HEIGHT: 66 IN | DIASTOLIC BLOOD PRESSURE: 64 MMHG

## 2023-12-27 DIAGNOSIS — N64.4 BREAST PAIN IN FEMALE: Primary | ICD-10-CM

## 2023-12-27 PROCEDURE — 99999 PR PBB SHADOW E&M-EST. PATIENT-LVL III: ICD-10-PCS | Mod: PBBFAC,,, | Performed by: OBSTETRICS & GYNECOLOGY

## 2023-12-27 PROCEDURE — 3078F PR MOST RECENT DIASTOLIC BLOOD PRESSURE < 80 MM HG: ICD-10-PCS | Mod: CPTII,S$GLB,, | Performed by: OBSTETRICS & GYNECOLOGY

## 2023-12-27 PROCEDURE — 3074F PR MOST RECENT SYSTOLIC BLOOD PRESSURE < 130 MM HG: ICD-10-PCS | Mod: CPTII,S$GLB,, | Performed by: OBSTETRICS & GYNECOLOGY

## 2023-12-27 PROCEDURE — 99213 PR OFFICE/OUTPT VISIT, EST, LEVL III, 20-29 MIN: ICD-10-PCS | Mod: S$GLB,,, | Performed by: OBSTETRICS & GYNECOLOGY

## 2023-12-27 PROCEDURE — 3008F PR BODY MASS INDEX (BMI) DOCUMENTED: ICD-10-PCS | Mod: CPTII,S$GLB,, | Performed by: OBSTETRICS & GYNECOLOGY

## 2023-12-27 PROCEDURE — 1159F MED LIST DOCD IN RCRD: CPT | Mod: CPTII,S$GLB,, | Performed by: OBSTETRICS & GYNECOLOGY

## 2023-12-27 PROCEDURE — 1159F PR MEDICATION LIST DOCUMENTED IN MEDICAL RECORD: ICD-10-PCS | Mod: CPTII,S$GLB,, | Performed by: OBSTETRICS & GYNECOLOGY

## 2023-12-27 PROCEDURE — 3074F SYST BP LT 130 MM HG: CPT | Mod: CPTII,S$GLB,, | Performed by: OBSTETRICS & GYNECOLOGY

## 2023-12-27 PROCEDURE — 3008F BODY MASS INDEX DOCD: CPT | Mod: CPTII,S$GLB,, | Performed by: OBSTETRICS & GYNECOLOGY

## 2023-12-27 PROCEDURE — 3078F DIAST BP <80 MM HG: CPT | Mod: CPTII,S$GLB,, | Performed by: OBSTETRICS & GYNECOLOGY

## 2023-12-27 PROCEDURE — 99999 PR PBB SHADOW E&M-EST. PATIENT-LVL III: CPT | Mod: PBBFAC,,, | Performed by: OBSTETRICS & GYNECOLOGY

## 2023-12-27 PROCEDURE — 99213 OFFICE O/P EST LOW 20 MIN: CPT | Mod: S$GLB,,, | Performed by: OBSTETRICS & GYNECOLOGY

## 2023-12-27 NOTE — PROGRESS NOTES
Subjective:      Patient ID: Diana Staley is a 33 y.o. female.    Chief Complaint:  Breast Pain      History of Present Illness  HPI  Presents with breast pain.  Pt stopped taking Kurvelo OCP's in September.  Since stopping them, her breasts have been more tender.  Right > left.  Prior to taking OCP's, pt would have cyclical breast tenderness, but states it is happening daily now.  No obvious breast lumps.  Pt drinks 1 cup of coffee per day.  Does not smoke.    GYN & OB History  Patient's last menstrual period was 12/10/2023 (exact date).   Date of Last Pap: 2021    OB History    Para Term  AB Living   0 0 0 0 0 0   SAB IAB Ectopic Multiple Live Births   0 0 0 0         Review of Systems  Review of Systems       Objective:     Physical Exam:   Constitutional: She is oriented to person, place, and time. She appears well-developed and well-nourished. No distress.        Pulmonary/Chest: Right breast exhibits tenderness (diffuse). Right breast exhibits no inverted nipple, no mass, no nipple discharge, no skin change, no bleeding and no swelling. Left breast exhibits tenderness (diffuse). Left breast exhibits no inverted nipple, no mass, no nipple discharge, no skin change, no bleeding and no swelling. Breasts are symmetrical.                         Lymphadenopathy:     She has no cervical adenopathy.    Neurological: She is alert and oriented to person, place, and time.    Skin: No rash noted.    Psychiatric: She has a normal mood and affect.         Assessment:     1. Breast pain in female               Plan:     Diana was seen today for breast pain.    Diagnoses and all orders for this visit:    Breast pain in female     Reviewed pathophysiology of breast pain, and discussed this is typically a benign condition.  Advised to minimize caffeine.  Can alternate tylenol or ibuprofen as needed for breast soreness.  Discussed daily evening primrose oil.  RTC 6-8 weeks for repeat breast exam.

## 2024-01-12 ENCOUNTER — PATIENT OUTREACH (OUTPATIENT)
Dept: ADMINISTRATIVE | Facility: HOSPITAL | Age: 34
End: 2024-01-12
Payer: COMMERCIAL

## 2024-01-12 NOTE — PROGRESS NOTES
Working not seen in 12 months.    [facility]-no results found.  [facility]- no results found.  [facility]-no recent results found.    LM offering to schedule annual exam.

## 2024-02-16 ENCOUNTER — PATIENT OUTREACH (OUTPATIENT)
Dept: ADMINISTRATIVE | Facility: HOSPITAL | Age: 34
End: 2024-02-16
Payer: COMMERCIAL

## 2024-02-16 NOTE — PROGRESS NOTES
Working not seen in 12 months.  Pt last seen Dec 2022.    LM offering to schedule annual exam.

## 2024-02-20 ENCOUNTER — PATIENT MESSAGE (OUTPATIENT)
Dept: ADMINISTRATIVE | Facility: HOSPITAL | Age: 34
End: 2024-02-20
Payer: COMMERCIAL

## 2024-05-09 ENCOUNTER — OFFICE VISIT (OUTPATIENT)
Dept: OBSTETRICS AND GYNECOLOGY | Facility: CLINIC | Age: 34
End: 2024-05-09
Payer: COMMERCIAL

## 2024-05-09 VITALS
HEIGHT: 66 IN | SYSTOLIC BLOOD PRESSURE: 116 MMHG | WEIGHT: 114.88 LBS | DIASTOLIC BLOOD PRESSURE: 70 MMHG | BODY MASS INDEX: 18.46 KG/M2

## 2024-05-09 DIAGNOSIS — Z12.4 CERVICAL CANCER SCREENING: ICD-10-CM

## 2024-05-09 DIAGNOSIS — R10.2 PELVIC PAIN: ICD-10-CM

## 2024-05-09 DIAGNOSIS — Z01.419 WELL WOMAN EXAM WITH ROUTINE GYNECOLOGICAL EXAM: Primary | ICD-10-CM

## 2024-05-09 PROCEDURE — 3078F DIAST BP <80 MM HG: CPT | Mod: CPTII,S$GLB,, | Performed by: OBSTETRICS & GYNECOLOGY

## 2024-05-09 PROCEDURE — 1159F MED LIST DOCD IN RCRD: CPT | Mod: CPTII,S$GLB,, | Performed by: OBSTETRICS & GYNECOLOGY

## 2024-05-09 PROCEDURE — 88175 CYTOPATH C/V AUTO FLUID REDO: CPT | Performed by: OBSTETRICS & GYNECOLOGY

## 2024-05-09 PROCEDURE — 99999 PR PBB SHADOW E&M-EST. PATIENT-LVL III: CPT | Mod: PBBFAC,,, | Performed by: OBSTETRICS & GYNECOLOGY

## 2024-05-09 PROCEDURE — 3074F SYST BP LT 130 MM HG: CPT | Mod: CPTII,S$GLB,, | Performed by: OBSTETRICS & GYNECOLOGY

## 2024-05-09 PROCEDURE — 87624 HPV HI-RISK TYP POOLED RSLT: CPT | Performed by: OBSTETRICS & GYNECOLOGY

## 2024-05-09 PROCEDURE — 3008F BODY MASS INDEX DOCD: CPT | Mod: CPTII,S$GLB,, | Performed by: OBSTETRICS & GYNECOLOGY

## 2024-05-09 PROCEDURE — 99395 PREV VISIT EST AGE 18-39: CPT | Mod: S$GLB,,, | Performed by: OBSTETRICS & GYNECOLOGY

## 2024-05-09 NOTE — PROGRESS NOTES
Subjective     Patient ID: Diana Staley is a 33 y.o. female.    Chief Complaint:  Well Woman      History of Present Illness  HPI  Presents for well-woman exam.  Pt stopped taking Kurvelo OCP's in September, as she is not currently sexually active.  Initially had significant BL breast tenderness afterwards, for which she saw me and had benign breast exam.  Took evening primrose oil for awhile, and tenderness has markedly improved.  Still gets some tenderness around the time of her period.  Periods are monthly.  Lately, she has had intermittent LLQ pain that sometimes radiates up and sometimes to the other side of the pelvis.  Feels aching/squeezing in nature.  Has intermittent bloating and fatigue.  Has struggled with constipation lately, and took miralax to help achieve a BM.  Last pap: 4/15/21: normal    GYN & OB History  Patient's last menstrual period was 2024.   Date of Last Pap: 2021    OB History    Para Term  AB Living   0 0 0 0 0 0   SAB IAB Ectopic Multiple Live Births   0 0 0 0         Review of Systems  Review of Systems       Objective   Physical Exam:   Constitutional: She is oriented to person, place, and time. She appears well-developed and well-nourished. No distress.      Neck: No thyromegaly present.     Pulmonary/Chest: Right breast exhibits no inverted nipple, no mass, no nipple discharge, no skin change, no tenderness, no bleeding and no swelling. Left breast exhibits no inverted nipple, no mass, no nipple discharge, no skin change, no tenderness, no bleeding and no swelling. Breasts are symmetrical.            Abdominal: Soft. She exhibits no distension and no mass. There is no abdominal tenderness. There is no rebound and no guarding.     Genitourinary:    Pelvic exam was performed with patient supine.   There is no rash, tenderness, lesion or injury on the right labia. There is no rash, tenderness, lesion or injury on the left labia. Cervix is normal.  Right adnexum displays no mass, no tenderness and no fullness. Left adnexum displays no mass, no tenderness and no fullness. No erythema, vaginal discharge, tenderness, bleeding, rectocele or cystocele in the vagina.    No foreign body in the vagina.      No signs of injury in the vagina.   Cervix exhibits no motion tenderness, no discharge and no friability. Uterus is not deviated, not enlarged, not fixed, not tender and not hosting fibroids.               Neurological: She is alert and oriented to person, place, and time.     Psychiatric: She has a normal mood and affect.            Assessment and Plan     1. Well woman exam with routine gynecological exam    2. Cervical cancer screening    3. Pelvic pain             Plan:  Diana was seen today for well woman.    Diagnoses and all orders for this visit:    Well woman exam with routine gynecological exam    Cervical cancer screening  -     Liquid-Based Pap Smear, Screening  -     HPV High Risk Genotypes, PCR    Pelvic pain  -     US Pelvis Complete Non OB; Future     Reviewed updated recommendations for pap smears (every 3 years) in low risk patients.   Recommend annual pelvic exams.  Reviewed recommendations for annual CBE.  Reviewed gyn and non-gyn etiologies for pelvic pain.  Discussed tips to prevent constipation.  Ultrasound as above.    RTC 1 year or sooner prn.

## 2024-05-15 ENCOUNTER — HOSPITAL ENCOUNTER (OUTPATIENT)
Dept: RADIOLOGY | Facility: HOSPITAL | Age: 34
Discharge: HOME | End: 2024-05-15
Attending: OBSTETRICS & GYNECOLOGY
Payer: COMMERCIAL

## 2024-05-15 DIAGNOSIS — R10.2 PELVIC PAIN: ICD-10-CM

## 2024-05-15 PROCEDURE — 76856 US EXAM PELVIC COMPLETE: CPT | Mod: 26,,, | Performed by: RADIOLOGY

## 2024-05-15 PROCEDURE — 76830 TRANSVAGINAL US NON-OB: CPT | Mod: TC

## 2024-05-15 PROCEDURE — 76830 TRANSVAGINAL US NON-OB: CPT | Mod: 26,,, | Performed by: RADIOLOGY

## 2024-05-16 LAB
HPV HR 12 DNA SPEC QL NAA+PROBE: NEGATIVE
HPV16 AG SPEC QL: NEGATIVE
HPV18 DNA SPEC QL NAA+PROBE: NEGATIVE

## 2024-05-17 LAB
FINAL PATHOLOGIC DIAGNOSIS: NORMAL
Lab: NORMAL

## 2025-07-02 ENCOUNTER — OFFICE VISIT (OUTPATIENT)
Dept: OBSTETRICS AND GYNECOLOGY | Facility: CLINIC | Age: 35
End: 2025-07-02
Payer: MEDICAID

## 2025-07-02 ENCOUNTER — LAB VISIT (OUTPATIENT)
Dept: LAB | Facility: HOSPITAL | Age: 35
End: 2025-07-02
Attending: OBSTETRICS & GYNECOLOGY
Payer: MEDICAID

## 2025-07-02 VITALS
SYSTOLIC BLOOD PRESSURE: 100 MMHG | DIASTOLIC BLOOD PRESSURE: 60 MMHG | HEIGHT: 66 IN | BODY MASS INDEX: 17.58 KG/M2 | WEIGHT: 109.38 LBS

## 2025-07-02 DIAGNOSIS — Z11.3 SCREEN FOR STD (SEXUALLY TRANSMITTED DISEASE): ICD-10-CM

## 2025-07-02 DIAGNOSIS — Z01.419 WELL WOMAN EXAM WITH ROUTINE GYNECOLOGICAL EXAM: Primary | ICD-10-CM

## 2025-07-02 LAB
HBV SURFACE AG SERPL QL IA: NORMAL
HCV AB SERPL QL IA: NORMAL
HIV 1+2 AB+HIV1 P24 AG SERPL QL IA: NORMAL
T PALLIDUM IGG+IGM SER QL: NORMAL

## 2025-07-02 PROCEDURE — 3008F BODY MASS INDEX DOCD: CPT | Mod: CPTII,,, | Performed by: OBSTETRICS & GYNECOLOGY

## 2025-07-02 PROCEDURE — 1159F MED LIST DOCD IN RCRD: CPT | Mod: CPTII,,, | Performed by: OBSTETRICS & GYNECOLOGY

## 2025-07-02 PROCEDURE — 87389 HIV-1 AG W/HIV-1&-2 AB AG IA: CPT

## 2025-07-02 PROCEDURE — 87340 HEPATITIS B SURFACE AG IA: CPT

## 2025-07-02 PROCEDURE — 99395 PREV VISIT EST AGE 18-39: CPT | Mod: S$PBB,,, | Performed by: OBSTETRICS & GYNECOLOGY

## 2025-07-02 PROCEDURE — 3074F SYST BP LT 130 MM HG: CPT | Mod: CPTII,,, | Performed by: OBSTETRICS & GYNECOLOGY

## 2025-07-02 PROCEDURE — 36415 COLL VENOUS BLD VENIPUNCTURE: CPT

## 2025-07-02 PROCEDURE — 86593 SYPHILIS TEST NON-TREP QUANT: CPT

## 2025-07-02 PROCEDURE — 3078F DIAST BP <80 MM HG: CPT | Mod: CPTII,,, | Performed by: OBSTETRICS & GYNECOLOGY

## 2025-07-02 PROCEDURE — 86803 HEPATITIS C AB TEST: CPT

## 2025-07-02 PROCEDURE — 99999 PR PBB SHADOW E&M-EST. PATIENT-LVL III: CPT | Mod: PBBFAC,,, | Performed by: OBSTETRICS & GYNECOLOGY

## 2025-07-02 PROCEDURE — 99213 OFFICE O/P EST LOW 20 MIN: CPT | Mod: PBBFAC | Performed by: OBSTETRICS & GYNECOLOGY

## 2025-07-02 NOTE — PROGRESS NOTES
Subjective     Patient ID: Diana Staley is a 34 y.o. female.    Chief Complaint:  Well Woman and STD CHECK      History of Present Illness  HPI  Presents for well-woman exam.  No gyn complaints.  No longer on OCP's.  Has regular, monthly periods.  Prior breast pain resolved.  Is sexually active with a new male partner.  Intermittent condom use.  Possibly considering non-hormonal contraception, but declines that at this time.  Wants std screening.  Last pap: 24: normal, HPV neg    GYN & OB History  Patient's last menstrual period was 2025.   Date of Last Pap: 2024    OB History    Para Term  AB Living   0 0 0 0 0 0   SAB IAB Ectopic Multiple Live Births   0 0 0 0        Review of Systems  Review of Systems       Objective   Physical Exam:   Constitutional: She is oriented to person, place, and time. She appears well-developed and well-nourished. No distress.      Neck: No thyromegaly present.     Pulmonary/Chest: Right breast exhibits no inverted nipple, no mass, no nipple discharge, no skin change, no tenderness, no bleeding and no swelling. Left breast exhibits no inverted nipple, no mass, no nipple discharge, no skin change, no tenderness, no bleeding and no swelling. Breasts are symmetrical.        Abdominal: Soft. She exhibits no distension and no mass. There is no abdominal tenderness. There is no rebound and no guarding.     Genitourinary:    Pelvic exam was performed with patient supine.   There is no rash, tenderness, lesion or injury on the right labia. There is no rash, tenderness, lesion or injury on the left labia. Cervix is normal. Right adnexum displays no mass, no tenderness and no fullness. Left adnexum displays no mass, no tenderness and no fullness. No erythema, vaginal discharge, tenderness, bleeding, rectocele or cystocele in the vagina.    No foreign body in the vagina.      No signs of injury in the vagina.   Cervix exhibits no motion tenderness, no  discharge and no friability. Uterus is not deviated, not enlarged, not fixed, not tender and not hosting fibroids.               Neurological: She is alert and oriented to person, place, and time.     Psychiatric: She has a normal mood and affect.       Wet prep: normal vaginal squamous epithelial cells       Assessment and Plan     1. Well woman exam with routine gynecological exam    2. Screen for STD (sexually transmitted disease)             Plan:  Diana was seen today for well woman and std check.    Diagnoses and all orders for this visit:    Well woman exam with routine gynecological exam    Screen for STD (sexually transmitted disease)  -     C. trachomatis/N. gonorrhoeae by AMP DNA  -     HIV 1/2 Ag/Ab (4th Gen); Future  -     Hepatitis B Surface Antigen; Future  -     Hepatitis C Antibody; Future  -     Treponema Pallidium Antibodies IgG, IgM; Future     Reviewed updated recommendations for pap smears (every 3 years) in low risk patients.   Recommend annual pelvic exams.  Reviewed recommendations for annual CBE.  Discussed paragard IUD.  Pt will consider options, but declines IUD at this time.  RTC 1 year.

## 2025-07-03 ENCOUNTER — RESULTS FOLLOW-UP (OUTPATIENT)
Dept: OBSTETRICS AND GYNECOLOGY | Facility: HOSPITAL | Age: 35
End: 2025-07-03